# Patient Record
Sex: FEMALE | Race: BLACK OR AFRICAN AMERICAN | Employment: UNEMPLOYED | URBAN - NONMETROPOLITAN AREA
[De-identification: names, ages, dates, MRNs, and addresses within clinical notes are randomized per-mention and may not be internally consistent; named-entity substitution may affect disease eponyms.]

---

## 2024-08-24 ENCOUNTER — HOSPITAL ENCOUNTER (EMERGENCY)
Age: 29
Discharge: HOME | End: 2024-08-24
Payer: COMMERCIAL

## 2024-08-24 ENCOUNTER — APPOINTMENT (OUTPATIENT)
Dept: CT IMAGING | Age: 29
End: 2024-08-24
Payer: COMMERCIAL

## 2024-08-24 VITALS
DIASTOLIC BLOOD PRESSURE: 88 MMHG | SYSTOLIC BLOOD PRESSURE: 113 MMHG | OXYGEN SATURATION: 100 % | TEMPERATURE: 99 F | HEART RATE: 76 BPM | RESPIRATION RATE: 18 BRPM | WEIGHT: 180 LBS

## 2024-08-24 DIAGNOSIS — R10.30 LOWER ABDOMINAL PAIN: Primary | ICD-10-CM

## 2024-08-24 DIAGNOSIS — R30.0 DYSURIA: ICD-10-CM

## 2024-08-24 LAB
ALBUMIN SERPL BCG-MCNC: 4.3 G/DL (ref 3.5–5.1)
ALP SERPL-CCNC: 55 U/L (ref 38–126)
ALT SERPL W/O P-5'-P-CCNC: 6 U/L (ref 11–66)
ANION GAP SERPL CALC-SCNC: 9 MEQ/L (ref 8–16)
AST SERPL-CCNC: 10 U/L (ref 5–40)
B-HCG SERPL QL: NEGATIVE
BACTERIA URNS QL MICRO: ABNORMAL /HPF
BASOPHILS ABSOLUTE: 0 THOU/MM3 (ref 0–0.1)
BASOPHILS NFR BLD AUTO: 1.1 %
BILIRUB CONJ SERPL-MCNC: < 0.1 MG/DL (ref 0.1–13.8)
BILIRUB SERPL-MCNC: 0.5 MG/DL (ref 0.3–1.2)
BILIRUB UR QL STRIP.AUTO: NEGATIVE
BUN SERPL-MCNC: 11 MG/DL (ref 7–22)
CALCIUM SERPL-MCNC: 9.2 MG/DL (ref 8.5–10.5)
CASTS #/AREA URNS LPF: ABNORMAL /LPF
CASTS 2: ABNORMAL /LPF
CHARACTER UR: ABNORMAL
CHLORIDE SERPL-SCNC: 104 MEQ/L (ref 98–111)
CO2 SERPL-SCNC: 25 MEQ/L (ref 23–33)
COLOR, UA: YELLOW
CREAT SERPL-MCNC: 0.7 MG/DL (ref 0.4–1.2)
CRYSTALS URNS MICRO: ABNORMAL
DEPRECATED RDW RBC AUTO: 43.8 FL (ref 35–45)
EOSINOPHIL NFR BLD AUTO: 5.1 %
EOSINOPHILS ABSOLUTE: 0.2 THOU/MM3 (ref 0–0.4)
EPITHELIAL CELLS, UA: ABNORMAL /HPF
ERYTHROCYTE [DISTWIDTH] IN BLOOD BY AUTOMATED COUNT: 13.3 % (ref 11.5–14.5)
GFR SERPL CREATININE-BSD FRML MDRD: > 90 ML/MIN/1.73M2
GLUCOSE SERPL-MCNC: 96 MG/DL (ref 70–108)
GLUCOSE UR QL STRIP.AUTO: NEGATIVE MG/DL
HCT VFR BLD AUTO: 43.3 % (ref 37–47)
HGB BLD-MCNC: 13.7 GM/DL (ref 12–16)
HGB UR QL STRIP.AUTO: ABNORMAL
IMM GRANULOCYTES # BLD AUTO: 0.02 THOU/MM3 (ref 0–0.07)
IMM GRANULOCYTES NFR BLD AUTO: 0.4 %
KETONES UR QL STRIP.AUTO: NEGATIVE
LIPASE SERPL-CCNC: 16.3 U/L (ref 5.6–51.3)
LYMPHOCYTES ABSOLUTE: 1.7 THOU/MM3 (ref 1–4.8)
LYMPHOCYTES NFR BLD AUTO: 37.9 %
MCH RBC QN AUTO: 28.1 PG (ref 26–33)
MCHC RBC AUTO-ENTMCNC: 31.6 GM/DL (ref 32.2–35.5)
MCV RBC AUTO: 88.9 FL (ref 81–99)
MISCELLANEOUS 2: ABNORMAL
MONOCYTES ABSOLUTE: 0.5 THOU/MM3 (ref 0.4–1.3)
MONOCYTES NFR BLD AUTO: 10.4 %
NEUTROPHILS ABSOLUTE: 2 THOU/MM3 (ref 1.8–7.7)
NEUTROPHILS NFR BLD AUTO: 45.1 %
NITRITE UR QL STRIP: NEGATIVE
NRBC BLD AUTO-RTO: 0 /100 WBC
OSMOLALITY SERPL CALC.SUM OF ELEC: 274.9 MOSMOL/KG (ref 275–300)
PH UR STRIP.AUTO: 5.5 [PH] (ref 5–9)
PLATELET # BLD AUTO: 285 THOU/MM3 (ref 130–400)
PMV BLD AUTO: 9.7 FL (ref 9.4–12.4)
POTASSIUM SERPL-SCNC: 4.3 MEQ/L (ref 3.5–5.2)
PROT SERPL-MCNC: 7.1 G/DL (ref 6.1–8)
PROT UR STRIP.AUTO-MCNC: ABNORMAL MG/DL
RBC # BLD AUTO: 4.87 MILL/MM3 (ref 4.2–5.4)
RBC URINE: ABNORMAL /HPF
RENAL EPI CELLS #/AREA URNS HPF: ABNORMAL /[HPF]
SODIUM SERPL-SCNC: 138 MEQ/L (ref 135–145)
SP GR UR REFRACT.AUTO: 1.02 (ref 1–1.03)
UROBILINOGEN, URINE: 0.2 EU/DL (ref 0–1)
WBC # BLD AUTO: 4.5 THOU/MM3 (ref 4.8–10.8)
WBC #/AREA URNS HPF: ABNORMAL /HPF
WBC #/AREA URNS HPF: ABNORMAL /[HPF]
YEAST LIKE FUNGI URNS QL MICRO: ABNORMAL

## 2024-08-24 PROCEDURE — 6360000004 HC RX CONTRAST MEDICATION: Performed by: PHYSICIAN ASSISTANT

## 2024-08-24 PROCEDURE — 36415 COLL VENOUS BLD VENIPUNCTURE: CPT

## 2024-08-24 PROCEDURE — 80048 BASIC METABOLIC PNL TOTAL CA: CPT

## 2024-08-24 PROCEDURE — 85025 COMPLETE CBC W/AUTO DIFF WBC: CPT

## 2024-08-24 PROCEDURE — 80076 HEPATIC FUNCTION PANEL: CPT

## 2024-08-24 PROCEDURE — 83690 ASSAY OF LIPASE: CPT

## 2024-08-24 PROCEDURE — 96374 THER/PROPH/DIAG INJ IV PUSH: CPT

## 2024-08-24 PROCEDURE — 84703 CHORIONIC GONADOTROPIN ASSAY: CPT

## 2024-08-24 PROCEDURE — 87086 URINE CULTURE/COLONY COUNT: CPT

## 2024-08-24 PROCEDURE — 6360000002 HC RX W HCPCS: Performed by: PHYSICIAN ASSISTANT

## 2024-08-24 PROCEDURE — 99285 EMERGENCY DEPT VISIT HI MDM: CPT

## 2024-08-24 PROCEDURE — 74177 CT ABD & PELVIS W/CONTRAST: CPT

## 2024-08-24 PROCEDURE — 81001 URINALYSIS AUTO W/SCOPE: CPT

## 2024-08-24 PROCEDURE — 2580000003 HC RX 258: Performed by: PHYSICIAN ASSISTANT

## 2024-08-24 RX ORDER — IOPAMIDOL 755 MG/ML
80 INJECTION, SOLUTION INTRAVASCULAR
Status: COMPLETED | OUTPATIENT
Start: 2024-08-24 | End: 2024-08-24

## 2024-08-24 RX ORDER — 0.9 % SODIUM CHLORIDE 0.9 %
1000 INTRAVENOUS SOLUTION INTRAVENOUS ONCE
Status: COMPLETED | OUTPATIENT
Start: 2024-08-24 | End: 2024-08-24

## 2024-08-24 RX ORDER — DOCUSATE SODIUM 100 MG/1
100 CAPSULE, LIQUID FILLED ORAL 2 TIMES DAILY PRN
Qty: 30 CAPSULE | Refills: 0 | Status: SHIPPED | OUTPATIENT
Start: 2024-08-24 | End: 2024-08-24

## 2024-08-24 RX ORDER — NITROFURANTOIN 25; 75 MG/1; MG/1
100 CAPSULE ORAL 2 TIMES DAILY
Qty: 14 CAPSULE | Refills: 0 | Status: SHIPPED | OUTPATIENT
Start: 2024-08-24 | End: 2024-08-31

## 2024-08-24 RX ORDER — DOCUSATE SODIUM 100 MG/1
100 CAPSULE, LIQUID FILLED ORAL 2 TIMES DAILY PRN
Qty: 30 CAPSULE | Refills: 0 | Status: SHIPPED | OUTPATIENT
Start: 2024-08-24

## 2024-08-24 RX ORDER — NITROFURANTOIN 25; 75 MG/1; MG/1
100 CAPSULE ORAL 2 TIMES DAILY
Qty: 14 CAPSULE | Refills: 0 | Status: SHIPPED | OUTPATIENT
Start: 2024-08-24 | End: 2024-08-24

## 2024-08-24 RX ORDER — KETOROLAC TROMETHAMINE 30 MG/ML
15 INJECTION, SOLUTION INTRAMUSCULAR; INTRAVENOUS ONCE
Status: COMPLETED | OUTPATIENT
Start: 2024-08-24 | End: 2024-08-24

## 2024-08-24 RX ADMIN — KETOROLAC TROMETHAMINE 15 MG: 30 INJECTION, SOLUTION INTRAMUSCULAR at 11:03

## 2024-08-24 RX ADMIN — SODIUM CHLORIDE 1000 ML: 9 INJECTION, SOLUTION INTRAVENOUS at 11:03

## 2024-08-24 RX ADMIN — IOPAMIDOL 80 ML: 755 INJECTION, SOLUTION INTRAVENOUS at 12:03

## 2024-08-24 ASSESSMENT — ENCOUNTER SYMPTOMS
ABDOMINAL PAIN: 1
CONSTIPATION: 0
BLOOD IN STOOL: 0
ABDOMINAL DISTENTION: 0
DIARRHEA: 0
RESPIRATORY NEGATIVE: 1

## 2024-08-24 ASSESSMENT — PAIN SCALES - GENERAL
PAINLEVEL_OUTOF10: 6
PAINLEVEL_OUTOF10: 4
PAINLEVEL_OUTOF10: 2

## 2024-08-24 ASSESSMENT — PAIN DESCRIPTION - ORIENTATION: ORIENTATION: RIGHT

## 2024-08-24 ASSESSMENT — PAIN DESCRIPTION - LOCATION: LOCATION: ABDOMEN

## 2024-08-24 ASSESSMENT — PAIN - FUNCTIONAL ASSESSMENT: PAIN_FUNCTIONAL_ASSESSMENT: 0-10

## 2024-08-24 NOTE — DISCHARGE INSTRUCTIONS
lafyèv, kèplen, oswa vomisman.  Ou gen nouvo sentòm oswa sentòm grav yon pwoblèm irinè. Pa egzanp:  Ou gen peraza oswa pi nan pipi ou.  Ou gen frison oswa kò fèmal.  Ou gen doulè grav michelle pipi.  Ou gen nouvo doulè nan lenn oswa nan vant ou.  Ou gen doulè nan do ou jis anba stephanie torasik ou ([facility] an).  Siveye deprè michelle chanjman nan sante ou, epitou asire ou kontakte doktè ou si ou gen nenpòt pwoblèm.  Ki kote ou kapab aprann plis?  Corey bakari   [email or web address]  Antre H814 nan bwat rechèch la michelle w aprann plis bakari \"Pipi ki [place] Doulè ([place]): Enstwiksyon Swen Sante.\"  Ajou nan cony: 15 November 2023  Benny duarte: 14.1  © 5859-9184 [employer].   Adapte anba yon lisans Enstriksyon michelle swen pwofesyonèl swen sante w la Si w gen kesyon bakari yon maladi oswa enstriksyon sa a, toujou mande pwofesyonèl swen sante w la. [employer] voye jete tout garanti oswa responsablite michelle itilize ou itilize enfòmasyon sa yo.               Doulè anba [place]: Enstwiksyon michelle Swen Sante Ou  Abdominal Pain: Care Instructions  Enstriksyon michelle swen ou     Doulè nan vant gen anpil kòz posib. Kèk nan kòz yo pa grav, epi yo corey poukont yo nan kèk willy. Lòt kòz yo bezwen plis tès ak tretman. Si doulè w ap kontinye, oswa si li giovany grav, ou bezwen re-tcheke michelle sa, epitou ou ka bezwen fè plis tès michelle chèche konnen si gen yon pwoblèm. Ou ka bezwen fè operasyon michelle korije pwoblèm nan.  Swiv nouvo sentòm yo, tankou lafyèv, anvi vomi ak vomisman, pwoblèm michelle pipi, doulè k ap giovany grav ak vètij. Sentòm sa yo ka se siy yon pwoblèm ki pi grav.  Doktè w ka rekòmande w yon vizit siplemantè nan pwochen 8 a 12 èdtan yo. Si ou pa refè, ou ka bezwen plis tès ak tretman.  Doktè a te tcheke w avèk anpil atansyon, men pwoblèm yo kapab devlope gale. Si w remake nenpòt pwoblèm oswa nouvo sentòm, chèche jwenn tretman medikal touswit.  Swen siplemantè se yon van enpòtan nan tretman ak sekirite ou. Asire ou pran tout randevou yo  sirena nan sante ou, epitou asire ou kontakte doktè w si :  Ou pa refè apre 1 willy (24 èdtan).  Ki kote ou kapab aprann plis?  Patricia bakari   [email or web address]  Antre E907 nan bwat rechèch la michelle w aprann plis bakari \"Doulè anba [place]: Enstwiksyon michelle Swen Sante Ou.\"  Ajou polina cony: 19 October 2023  Benny duarte: 14.1  © 7127-6133 [employer], [employer].   Adapte anba yon lisans Enstriksyon michelle swen pwofesyonèl swen sante w la Si w gen kesyon bakari yon maladi oswa enstriksyon sa a, toujou mande pwofesyonèl swen sante w la. [email or web address] voye jete tout garanti oswa responsablite michelle itilize ou itilize enfòmasyon sa yo.

## 2024-08-24 NOTE — ED NOTES
Report received from Tennille GAMBOA. Pt resting on cot with call light in reach. VS reassessed.  at bedside and in use at this time. Respirations are easy and unlabored. Pt denies any current needs at this time.

## 2024-08-24 NOTE — ED TRIAGE NOTES
used: Presents to ED via private transportation with complaints of ongoing abdominal pain. Pt reports she has been having the pain for 3 months, but became bad last night. Pt denies any nausea, vomiting, constipation or diarrhea.

## 2024-08-24 NOTE — ED PROVIDER NOTES
[facility] EMERGENCY DEPT      EMERGENCY MEDICINE     Pt Name: Ashok Newton  MRN: [id number]  Birthdate 1995  Date of evaluation: 8/24/2024  Provider: Param Fraga PA-C    CHIEF COMPLAINT       Chief Complaint   Patient presents with    Abdominal Pain     HISTORY OF PRESENT ILLNESS   Ashok Newton is a pleasant 28 y.o. female who presents to the emergency department for evaluation of Presents to ED via private transportation with complaints of ongoing abdominal pain. Pt reports she has been having the pain for 3 months, but became bad last night. Pt denies any nausea, vomiting, constipation or diarrhea.  Patient states she has had some burning when she urinates that she arrived to the  from the [place] 3 months ago.  She has denied any vaginal discharge states her periods are normal she just is ready to start her period and is started to have spotting yesterday.  She has not found anything that makes her pain better or worse she is passing gas and having normal bowel movements.  Symptoms have been going on since Friday and have had come and going symptoms she states that last about 4 to 5 minutes and then goes away for about an hour and comes back she describes it as pressure and sharp otherwise she is unable to quantify it.       used for evaluation. .   is Tania using interperter service in iPad    Review of Systems   Constitutional: Negative.    HENT: Negative.     Respiratory: Negative.     Cardiovascular: Negative.    Gastrointestinal:  Positive for abdominal pain. Negative for abdominal distention, blood in stool, constipation and diarrhea.   Genitourinary:  Positive for dysuria. Negative for difficulty urinating, flank pain, frequency, hematuria, menstrual problem and pelvic pain.   Musculoskeletal: Negative.    All other systems reviewed and are negative.      Presents to ED via private transportation with complaints of ongoing abdominal pain. Pt reports she  urine culture for suspected UTI.  Patient will be advised to use some stool softener which I will write a prescription for.  She will be discharged home in stable condition I do not suspect any other concerning etiology at this time.   Exam and discharge was both assisted with .     used for evaluation and discharge.  She voiced understanding and had no additional questions  service for iPad      Pt was reassessed and the results of pertinent diagnostic studies and exam findings were discussed. The patient’s provisional diagnosis and plan of care were discussed with the patient and present family utilizing shared decision-making. Any medications were reviewed and indications and risks of medications were discussed with the patient /family present. Strict verbal and written return precautions, instructions and appropriate follow-up provided to  the patient .                   ED Medications administered this visit:  (None if blank)  Medications   ketorolac (TORADOL) injection 15 mg (15 mg IntraVENous Given 8/24/24 1103)   sodium chloride 0.9 % bolus 1,000 mL (0 mLs IntraVENous Stopped 8/24/24 1305)   iopamidol (ISOVUE-370) 76 % injection 80 mL (80 mLs IntraVENous Given 8/24/24 1203)         PROCEDURES: (None if blank)  Procedures:     CRITICAL CARE: (None if blank)      DISCHARGE PRESCRIPTIONS: (None if blank)  Discharge Medication List as of 8/24/2024  2:37 PM          FINAL IMPRESSION      1. Lower abdominal pain    2. Dysuria          DISPOSITION/PLAN   DISPOSITION Decision To Discharge 08/24/2024 01:44:52 PM  Condition at Disposition: Good      OUTPATIENT FOLLOW UP THE PATIENT:  [facility]  [address] [place] [address]  [phone]  In 1 week  recheck urine and follow up for continued pain      FIORDALIZA Shannon Jamie M, PA-C  08/24/24 8760

## 2024-08-24 NOTE — ED NOTES
Pt resting on cot with call light in reach. VS reassessed.  at bedside and in use at this time. Respirations are easy and unlabored. Pt denies any current needs at this time.

## 2024-08-25 LAB
BACTERIA UR CULT: ABNORMAL
ORGANISM: ABNORMAL